# Patient Record
Sex: MALE | Race: WHITE | URBAN - METROPOLITAN AREA
[De-identification: names, ages, dates, MRNs, and addresses within clinical notes are randomized per-mention and may not be internally consistent; named-entity substitution may affect disease eponyms.]

---

## 2018-04-19 ENCOUNTER — HOSPITAL ENCOUNTER (EMERGENCY)
Facility: CLINIC | Age: 38
Discharge: HOME | End: 2018-04-19
Attending: EMERGENCY MEDICINE | Admitting: EMERGENCY MEDICINE

## 2018-04-19 VITALS
TEMPERATURE: 98.2 F | SYSTOLIC BLOOD PRESSURE: 125 MMHG | WEIGHT: 175 LBS | OXYGEN SATURATION: 98 % | RESPIRATION RATE: 16 BRPM | DIASTOLIC BLOOD PRESSURE: 83 MMHG | HEART RATE: 66 BPM

## 2018-04-19 DIAGNOSIS — F41.9 ANXIETY: ICD-10-CM

## 2018-04-19 DIAGNOSIS — F32.A DEPRESSION, UNSPECIFIED DEPRESSION TYPE: ICD-10-CM

## 2018-04-19 LAB
ALBUMIN SERPL-MCNC: 4.2 G/DL (ref 3.4–5)
ALP SERPL-CCNC: 77 U/L (ref 40–150)
ALT SERPL W P-5'-P-CCNC: 27 U/L (ref 0–70)
ANION GAP SERPL CALCULATED.3IONS-SCNC: 6 MMOL/L (ref 3–14)
APAP SERPL-MCNC: <2 MG/L (ref 10–20)
AST SERPL W P-5'-P-CCNC: 15 U/L (ref 0–45)
BASOPHILS # BLD AUTO: 0 10E9/L (ref 0–0.2)
BASOPHILS NFR BLD AUTO: 0.2 %
BILIRUB SERPL-MCNC: 0.4 MG/DL (ref 0.2–1.3)
BUN SERPL-MCNC: 13 MG/DL (ref 7–30)
CALCIUM SERPL-MCNC: 8.9 MG/DL (ref 8.5–10.1)
CHLORIDE SERPL-SCNC: 107 MMOL/L (ref 94–109)
CO2 SERPL-SCNC: 26 MMOL/L (ref 20–32)
CREAT SERPL-MCNC: 0.68 MG/DL (ref 0.66–1.25)
DIFFERENTIAL METHOD BLD: NORMAL
EOSINOPHIL # BLD AUTO: 0 10E9/L (ref 0–0.7)
EOSINOPHIL NFR BLD AUTO: 0.2 %
ERYTHROCYTE [DISTWIDTH] IN BLOOD BY AUTOMATED COUNT: 14.9 % (ref 10–15)
ETHANOL SERPL-MCNC: <0.01 G/DL
GFR SERPL CREATININE-BSD FRML MDRD: >90 ML/MIN/1.7M2
GLUCOSE SERPL-MCNC: 96 MG/DL (ref 70–99)
HCT VFR BLD AUTO: 43.4 % (ref 40–53)
HGB BLD-MCNC: 14.6 G/DL (ref 13.3–17.7)
IMM GRANULOCYTES # BLD: 0 10E9/L (ref 0–0.4)
IMM GRANULOCYTES NFR BLD: 0.2 %
LYMPHOCYTES # BLD AUTO: 1.9 10E9/L (ref 0.8–5.3)
LYMPHOCYTES NFR BLD AUTO: 22.7 %
MCH RBC QN AUTO: 30.3 PG (ref 26.5–33)
MCHC RBC AUTO-ENTMCNC: 33.6 G/DL (ref 31.5–36.5)
MCV RBC AUTO: 90 FL (ref 78–100)
MONOCYTES # BLD AUTO: 0.9 10E9/L (ref 0–1.3)
MONOCYTES NFR BLD AUTO: 10.4 %
NEUTROPHILS # BLD AUTO: 5.6 10E9/L (ref 1.6–8.3)
NEUTROPHILS NFR BLD AUTO: 66.3 %
PLATELET # BLD AUTO: 231 10E9/L (ref 150–450)
POTASSIUM SERPL-SCNC: 3.5 MMOL/L (ref 3.4–5.3)
PROT SERPL-MCNC: 7.5 G/DL (ref 6.8–8.8)
RBC # BLD AUTO: 4.82 10E12/L (ref 4.4–5.9)
SALICYLATES SERPL-MCNC: 2 MG/DL
SODIUM SERPL-SCNC: 139 MMOL/L (ref 133–144)
TSH SERPL DL<=0.005 MIU/L-ACNC: 0.94 MU/L (ref 0.4–4)
WBC # BLD AUTO: 8.5 10E9/L (ref 4–11)

## 2018-04-19 PROCEDURE — 80329 ANALGESICS NON-OPIOID 1 OR 2: CPT | Performed by: EMERGENCY MEDICINE

## 2018-04-19 PROCEDURE — 90791 PSYCH DIAGNOSTIC EVALUATION: CPT

## 2018-04-19 PROCEDURE — 93005 ELECTROCARDIOGRAM TRACING: CPT | Performed by: EMERGENCY MEDICINE

## 2018-04-19 PROCEDURE — 99285 EMERGENCY DEPT VISIT HI MDM: CPT | Mod: 25 | Performed by: EMERGENCY MEDICINE

## 2018-04-19 PROCEDURE — 85025 COMPLETE CBC W/AUTO DIFF WBC: CPT | Performed by: EMERGENCY MEDICINE

## 2018-04-19 PROCEDURE — 80320 DRUG SCREEN QUANTALCOHOLS: CPT | Performed by: EMERGENCY MEDICINE

## 2018-04-19 PROCEDURE — 93010 ELECTROCARDIOGRAM REPORT: CPT | Mod: Z6 | Performed by: EMERGENCY MEDICINE

## 2018-04-19 PROCEDURE — 84443 ASSAY THYROID STIM HORMONE: CPT | Performed by: EMERGENCY MEDICINE

## 2018-04-19 PROCEDURE — 80053 COMPREHEN METABOLIC PANEL: CPT | Performed by: EMERGENCY MEDICINE

## 2018-04-19 ASSESSMENT — ENCOUNTER SYMPTOMS
SHORTNESS OF BREATH: 0
ABDOMINAL PAIN: 1
FEVER: 0

## 2018-04-19 NOTE — ED AVS SNAPSHOT
[facility] Emergency Department    [address] [place] [address]    Phone:  [phone]    Fax:  [phone]                                       Jaswant Kincaid   MRN: [id number]    Department:  [facility] Emergency Department   Date of Visit:  4/19/2018           Patient Information     Date Of Birth          1980        Your diagnoses for this visit were:     Depression, unspecified depression type     Anxiety     Drug ingestion, undetermined intent, initial encounter        You were seen by Alcides Salgado MD.        Discharge Instructions       Follow up tomorrow at [facility] as planned.          Depression  Depression is one of the most common mental health problems today. It is not just a state of unhappiness or sadness. It is a true disease. The cause seems to be related to a decrease in chemicals that transmit signals in the brain. Having a family history of depression, alcoholism, or suicide increases the risk. Chronic illness, chronic pain, migraine headaches, and high emotional stress also increase the risk.  Depression is something we tend to recognize in others, but may have a hard time seeing in ourselves. It can show in many physical and emotional ways:    Loss of appetite    Overeating    Not being able to sleep    Sleeping too much    Tiredness not related to physical exertion    Restlessness or irritability    Slowness of movement or speech    Feeling depressed or withdrawn    Loss of interest in things you once enjoyed    Trouble concentrating, poor memory, trouble making decisions    Thoughts of harming or killing oneself, or thoughts that life is not worth living    Low self-esteem  The treatment for depression may include both medicine and psychotherapy. Antidepressants can reduce suffering and can improve the ability to function during the depressed period. Therapy can offer emotional support and help you understand emotional factors that may be  causing the depression.  Home care    Ongoing care and support help people manage this disease. Find a healthcare provider and therapist who meet your needs. Seek help when you feel like you may be getting ill.    Be kind to yourself. Make it a point to do things that you enjoy (gardening, walking in nature, going to a movie). Reward yourself for small successes.    Take care of your physical body. Eat a balanced diet (low in saturated fat and high in fruits and vegetables). Exercise at least 3 times a week for 30 minutes. Even mild-moderate exercise (like brisk walking) can make you feel better.    Don't drink alcohol, which can make depression worse.    Take medicine as prescribed.    Tell each of your healthcare providers about all of the prescription and over-the-counter medicines, vitamins, and supplements you take. Certain supplements interact with medicines and can result in dangerous side effects. Ask your pharmacist when you have questions about medicine interactions.    Talk with your family and trusted friends about your feelings and thoughts. Ask them to help you recognize behavior changes early so you can get help and, if needed, medicine can be adjusted.  Follow-up care  Follow up with your healthcare provider, or as advised.  Call 911  Call 911 if you:    Have suicidal thoughts, a suicide plan, and the means to carry out the plan; or serious thoughts of hurting someone else     Have trouble breathing    Are very confused    Feel very drowsy or have trouble awakening    Faint or lose consciousness    Have new chest pain that becomes more severe, lasts longer, or spreads into your shoulder, arm, neck, jaw, or back  When to seek medical advice  Call your healthcare provider right away if any of these happen:    Feeling extreme depression, fear, anxiety, or anger toward yourself or others    Feeling out of control    Feeling that you may try to harm yourself or another    Hearing voices that others do not  hear    Seeing things that others do not see    Can t sleep or eat for 3 days in a row    Friends or family express concern over your behavior and ask you to seek help  Date Last Reviewed: 10/1/2017    3447-4899 [employer]. [address] [place] [address], [place] [address]. All rights reserved. This information is not intended as a substitute for professional medical care. Always follow your healthcare professional's instructions.          24 Hour Appointment Hotline       To make an appointment at any [facility], call [phone] ([phone]). If you don't have a family doctor or clinic, we will help you find one. [place] clinics are conveniently located to serve the needs of you and your family.             Review of your medicines      Our records show that you are taking the medicines listed below. If these are incorrect, please call your family doctor or clinic.        Dose / Directions Last dose taken    DULOXETINE HCL PO        Refills:  0        GABAPENTIN PO        Refills:  0                Procedures and tests performed during your visit     Acetaminophen level    Alcohol ethyl    CBC with platelets differential    Comprehensive metabolic panel    EKG 12-lead, tracing only    Peripheral IV: Standard    Salicylate level    TSH with free T4 reflex      Orders Needing Specimen Collection     Ordered          04/19/18 0824  UA reflex to Microscopic - STAT, Prio: STAT, Needs to be Collected     Scheduled Task Status   04/19/18 0824 Collect UA reflex to Microscopic Open   Order Class:  PCU Collect                04/19/18 0824  Urine Drugs of Abuse Screen Panel 13 - ROUTINE, Prio: Routine, Needs to be Collected     Scheduled Task Status   04/19/18 0824 Collect Urine Drugs of Abuse Screen Panel 13 Open   Order Class:  PCU Collect                  Pending Results     No orders found from 4/17/2018 to 4/20/2018.            Pending Culture Results     No orders found from 4/17/2018 to 4/20/2018.  "           Pending Results Instructions     If you had any lab results that were not finalized at the time of your Discharge, you can call the ED Lab Result RN at [phone]. You will be contacted by this team for any positive Lab results or changes in treatment. The nurses are available 7 days a week from 10A to 6:30P.  You can leave a message 24 hours per day and they will return your call.        Thank you for choosing [facility]       Thank you for choosing [facility] for your care. Our goal is always to provide you with excellent care. Hearing back from our patients is one way we can continue to improve our services. Please take a few minutes to complete the written survey that you may receive in the mail after you visit with us. Thank you!        [employer] Information     [employer] lets you send messages to your doctor, view your test results, renew your prescriptions, schedule appointments and more. To sign up, go to [email or web address] . Click on \"Log in\" on the left side of the screen, which will take you to the Welcome page. Then click on \"Sign up Now\" on the right side of the page.     You will be asked to enter the access code listed below, as well as some personal information. Please follow the directions to create your username and password.     Your access code is: [email or web address]  Expires: 2018 11:43 AM     Your access code will  in 90 days. If you need help or a new code, please call your [facility] clinic or [phone].        Care EveryWhere ID     This is your Care EveryWhere ID. This could be used by other organizations to access your [facility] medical records  [id number]        Equal Access to Services     [facility]: Hadii luna Mckenzie, waleydida luqadaha, qanarendra sánchezalyuliya marvin. So [facility] [phone].    ATENCIÓN: Si habla español, tiene a lanc[place] disposición servicios gratuitos de asistencia lingüística. Llame al [phone].    We " comply with applicable federal civil rights laws and [place] laws. We do not discriminate on the basis of race, color, national origin, age, disability, sex, sexual orientation, or gender identity.            After Visit Summary       This is your record. Keep this with you and show to your community pharmacist(s) and doctor(s) at your next visit.

## 2018-04-19 NOTE — ED NOTES
0924 Yvonne called from the DEC - patient medically cleared. Maycol the  will be available in about half hour.

## 2018-04-19 NOTE — ED PROVIDER NOTES
History     Chief Complaint   Patient presents with     Depression     HPI  Jaswant Kincaid is a 37 year old male who has past medical history significant for depression, presenting to the emergency department after he was dropped off by a friend.  Patient is not very forthcoming with information, and therefore history is somewhat difficult to obtain.  I was present with nurse in the room, when we attempted to obtain additional history information.  Patient states that he took a handful of Advil which was mixed with ibuprofen this morning.  When asking the specific number of tablets, the patient states he took approximately 10 tablets.  He does not know how that number was counted, however would estimate 10 total tablets.  He does not tell me why he took these medications, and he denies that he took these in attempts at suicide.  He does state that he has been drinking more frequently recently, with his last drink at 2:30 AM, 6 hours prior to arrival.  Patient also does state that he did cocaine at midnight last night.  Patient is daily smoker, and denies any other drug use.  He denies worsening suicidal thoughts.  Does have mildly upset stomach, with no recent nausea, or vomiting.  No diarrhea.  No fever, or chills.  Patient does state that he follows up with psychiatrist at [facility] in [place].  Last visit was approximately 6 months ago.  He has an appointment tomorrow.  Patient does not tell me when specifically asked why he came to the hospital.    Problem List:    There are no active problems to display for this patient.       Past Medical History:    Past Medical History:   Diagnosis Date     Depressive disorder        Past Surgical History:    Past Surgical History:   Procedure Laterality Date     THORACOSCOPIC PLEURODESIS         Family History:    No family history on file.    Social History:  Marital Status:    Social History   Substance Use Topics     Smoking status: Current Every Day Smoker      Packs/day: 1.00     Smokeless tobacco: Never Used     Alcohol use Yes      Comment: binge over last 12 hours        Medications:      DULOXETINE HCL PO   GABAPENTIN PO         Review of Systems   Constitutional: Negative for fever.   Respiratory: Negative for shortness of breath.    Cardiovascular: Negative for chest pain.   Gastrointestinal: Positive for abdominal pain.   Psychiatric/Behavioral:        See HPI   All other systems reviewed and are negative.      Physical Exam   BP: 122/88  Pulse: 66  Heart Rate: 60  Temp: 98.2  F (36.8  C)  Resp: 16  Weight: 79.4 kg (175 lb)  SpO2: 98 %      Physical Exam   /83  Pulse 66  Temp 98.2  F (36.8  C) (Oral)  Resp 16  Wt 79.4 kg (175 lb)  SpO2 98%  General: alert, interactive, in no apparent distress  Head: atraumatic  Nose: no rhinorrhea or epistaxis  Ears: no external auditory canal discharge or bleeding.    Eyes: Sclera nonicteric. Conjunctiva noninjected.    Mouth: no tonsillar erythema, edema, or exudate  Neck: supple, no palp LAD  Lungs: CTAB  CV: RRR, S1/S2; peripheral pulses palpable and symmetric  Abdomen: soft, nt, nd, no guarding or rebound. Positive bowel sounds  Extremities: no cyanosis or edema  Skin: no rash or diaphoresis  Neuro:   strength 5/5 in UE and LEs bilaterally, sensation intact to light touch in UE and LEs bilaterally;  Psych: Denies suicidal/homicidal ideation, however depressed mood, flattened affect, with poor eye contact        ED Course     ED Course     Procedures                  EKG Interpretation:      Interpreted by Alcides Salgado  Time reviewed:0850   Symptoms at time of EKG: None   Rhythm: Normal sinus  and Sinus bradycardia  Rate: Bradycardia  Axis: Normal  Ectopy: None  Conduction: Normal  ST Segments/ T Waves: No ST-T wave changes and No acute ischemic changes  Comparison to prior: No old EKG available    Clinical Impression: sinus bradycardia      Critical Care time:  none               Results for orders placed or  performed during the hospital encounter of 04/19/18 (from the past 24 hour(s))   CBC with platelets differential   Result Value Ref Range    WBC 8.5 4.0 - 11.0 10e9/L    RBC Count 4.82 4.4 - 5.9 10e12/L    Hemoglobin 14.6 13.3 - 17.7 g/dL    Hematocrit 43.4 40.0 - 53.0 %    MCV 90 78 - 100 fl    MCH 30.3 26.5 - 33.0 pg    MCHC 33.6 31.5 - 36.5 g/dL    RDW 14.9 10.0 - 15.0 %    Platelet Count 231 150 - 450 10e9/L    Diff Method Automated Method     % Neutrophils 66.3 %    % Lymphocytes 22.7 %    % Monocytes 10.4 %    % Eosinophils 0.2 %    % Basophils 0.2 %    % Immature Granulocytes 0.2 %    Absolute Neutrophil 5.6 1.6 - 8.3 10e9/L    Absolute Lymphocytes 1.9 0.8 - 5.3 10e9/L    Absolute Monocytes 0.9 0.0 - 1.3 10e9/L    Absolute Eosinophils 0.0 0.0 - 0.7 10e9/L    Absolute Basophils 0.0 0.0 - 0.2 10e9/L    Abs Immature Granulocytes 0.0 0 - 0.4 10e9/L   Comprehensive metabolic panel   Result Value Ref Range    Sodium 139 133 - 144 mmol/L    Potassium 3.5 3.4 - 5.3 mmol/L    Chloride 107 94 - 109 mmol/L    Carbon Dioxide 26 20 - 32 mmol/L    Anion Gap 6 3 - 14 mmol/L    Glucose 96 70 - 99 mg/dL    Urea Nitrogen 13 7 - 30 mg/dL    Creatinine 0.68 0.66 - 1.25 mg/dL    GFR Estimate >90 >60 mL/min/1.7m2    GFR Estimate If Black >90 >60 mL/min/1.7m2    Calcium 8.9 8.5 - 10.1 mg/dL    Bilirubin Total 0.4 0.2 - 1.3 mg/dL    Albumin 4.2 3.4 - 5.0 g/dL    Protein Total 7.5 6.8 - 8.8 g/dL    Alkaline Phosphatase 77 40 - 150 U/L    ALT 27 0 - 70 U/L    AST 15 0 - 45 U/L   Salicylate level   Result Value Ref Range    Salicylate Level 2 mg/dL   Acetaminophen level   Result Value Ref Range    Acetaminophen Level <2 mg/L   Alcohol ethyl   Result Value Ref Range    Ethanol g/dL <0.01 <0.01 g/dL   TSH with free T4 reflex   Result Value Ref Range    TSH 0.94 0.40 - 4.00 mU/L       Medications   lidocaine (viscous) (XYLOCAINE) 2 % 15 mL, alum & mag hydroxide-simethicone (MYLANTA ES/MAALOX  ES) 15 mL GI Cocktail (30 mLs Oral Not Given  4/19/18 0905)       Assessments & Plan (with Medical Decision Making)  37 year old male, with history of depression, presenting to the emergency department after he was dropped off by a friend.  Patient is not very forthcoming with information, and therefore history is difficult to obtain.  He does state that he ingested approximately 10 tablets of ibuprofen/Advil this morning at 7 AM, 1 hour prior to arrival.  Patient was then dropped off by a friend.  Patient does not tell me why he took that medicine.  He did state to 1 of the nurses that he took this in order to dull the pain.  Patient specifically tells me that he is not suicidal.  He tells me that he has not had suicide attempt previously, however he delayed with answering that question.  Patient is otherwise not forthcoming with much more information.  He does state that he recently used alcohol at 2:30 AM, and has been drinking more frequently.  He also does state that he used cocaine at midnight.  He complains only of slight abdominal pain as his only physical complaint.  Denies any other medical complaints.    Patient's only medicines are duloxetine, and gabapentin.  No changes in those medicines.  He does have outpatient psychiatry follow-up, and has appointment tomorrow.  Plan is for obtaining baseline labs, screening acetaminophen, and alcohol level, and likely DEC consultation.  Laboratory workup is normal, with normal CBC, CMP, and salicylate and acetaminophen level are negative.  Alcohol level is negative.  DEC dilatation performed, and I spoke with the .  Patient is not actively suicidal.  Has follow-up with [facility] in [place] tomorrow at 3 PM.  Can have refills of medications at that point.  Also did find that patient had run out of medications 1 week ago, however I feel patient can follow-up tomorrow in clinic for this refill.  No indication for additional interventions at this point.  Follow-up with primary care clinic tomorrow as  planned.  Regarding ingestion of ibuprofen, no indication for further treatment.  Declining GI cocktail.  Abdominal exam is benign.     I have reviewed the nursing notes.    I have reviewed the findings, diagnosis, plan and need for follow up with the patient.       Discharge Medication List as of 4/19/2018 11:43 AM          Final diagnoses:   Depression, unspecified depression type   Anxiety   Drug ingestion, undetermined intent, initial encounter       4/19/2018   [facility] EMERGENCY DEPARTMENT     Alcides Salgado MD  04/19/18 6135

## 2018-04-19 NOTE — DISCHARGE INSTRUCTIONS
Follow up tomorrow at [facility] as planned.          Depression  Depression is one of the most common mental health problems today. It is not just a state of unhappiness or sadness. It is a true disease. The cause seems to be related to a decrease in chemicals that transmit signals in the brain. Having a family history of depression, alcoholism, or suicide increases the risk. Chronic illness, chronic pain, migraine headaches, and high emotional stress also increase the risk.  Depression is something we tend to recognize in others, but may have a hard time seeing in ourselves. It can show in many physical and emotional ways:    Loss of appetite    Overeating    Not being able to sleep    Sleeping too much    Tiredness not related to physical exertion    Restlessness or irritability    Slowness of movement or speech    Feeling depressed or withdrawn    Loss of interest in things you once enjoyed    Trouble concentrating, poor memory, trouble making decisions    Thoughts of harming or killing oneself, or thoughts that life is not worth living    Low self-esteem  The treatment for depression may include both medicine and psychotherapy. Antidepressants can reduce suffering and can improve the ability to function during the depressed period. Therapy can offer emotional support and help you understand emotional factors that may be causing the depression.  Home care    Ongoing care and support help people manage this disease. Find a healthcare provider and therapist who meet your needs. Seek help when you feel like you may be getting ill.    Be kind to yourself. Make it a point to do things that you enjoy (gardening, walking in nature, going to a movie). Reward yourself for small successes.    Take care of your physical body. Eat a balanced diet (low in saturated fat and high in fruits and vegetables). Exercise at least 3 times a week for 30 minutes. Even mild-moderate exercise (like brisk walking) can make you feel  better.    Don't drink alcohol, which can make depression worse.    Take medicine as prescribed.    Tell each of your healthcare providers about all of the prescription and over-the-counter medicines, vitamins, and supplements you take. Certain supplements interact with medicines and can result in dangerous side effects. Ask your pharmacist when you have questions about medicine interactions.    Talk with your family and trusted friends about your feelings and thoughts. Ask them to help you recognize behavior changes early so you can get help and, if needed, medicine can be adjusted.  Follow-up care  Follow up with your healthcare provider, or as advised.  Call 911  Call 911 if you:    Have suicidal thoughts, a suicide plan, and the means to carry out the plan; or serious thoughts of hurting someone else     Have trouble breathing    Are very confused    Feel very drowsy or have trouble awakening    Faint or lose consciousness    Have new chest pain that becomes more severe, lasts longer, or spreads into your shoulder, arm, neck, jaw, or back  When to seek medical advice  Call your healthcare provider right away if any of these happen:    Feeling extreme depression, fear, anxiety, or anger toward yourself or others    Feeling out of control    Feeling that you may try to harm yourself or another    Hearing voices that others do not hear    Seeing things that others do not see    Can t sleep or eat for 3 days in a row    Friends or family express concern over your behavior and ask you to seek help  Date Last Reviewed: 10/1/2017    9231-1146 [employer]. [address] [place] [address], [place] [address]. All rights reserved. This information is not intended as a substitute for professional medical care. Always follow your healthcare professional's instructions.

## 2018-04-19 NOTE — ED TRIAGE NOTES
"Pt here with depression, denies suicidal thoughts, took \"handful of advil this morning, trying to dull the pain\"  "

## 2018-04-19 NOTE — ED NOTES
Pt asked to give urine sample. Pt declines. Pt states he is unable to void at this time. Pt declined liquids. Lights turned down. Pt resting.

## 2018-04-19 NOTE — ED NOTES
Pt denies Suicidal Ideation. D/t pts behavior, Provider and writer has decided to place pt on a Health Officer Hold. Pt stripped via security and male RN. Belongings placed in locked drawer. Pt is on a watch in the ED. Security given report via writer. Security to document on High Risk Behavior/Suicide Risk Documentation Form. High risk behavior flow sheet is being filled out.

## 2018-04-19 NOTE — ED NOTES
Called the DEC to find out about time frame for assessment since its been an hour vs half hour. They are technical difficulties and should be available in about 15 min

## 2018-04-19 NOTE — ED AVS SNAPSHOT
[facility] Emergency Department    [address] [place] [address]    Phone:  [phone]    Fax:  [phone]                                       Jaswant Kincaid   MRN: [id number]    Department:  [facility] Emergency Department   Date of Visit:  4/19/2018           After Visit Summary Signature Page     I have received my discharge instructions, and my questions have been answered. I have discussed any challenges I see with this plan with the nurse or doctor.    ..........................................................................................................................................  Patient/Patient Representative Signature      ..........................................................................................................................................  Patient Representative Print Name and Relationship to Patient    ..................................................               ................................................  Date                                            Time    ..........................................................................................................................................  Reviewed by Signature/Title    ...................................................              ..............................................  Date                                                            Time